# Patient Record
Sex: MALE | Race: WHITE | NOT HISPANIC OR LATINO | Employment: UNEMPLOYED | URBAN - METROPOLITAN AREA
[De-identification: names, ages, dates, MRNs, and addresses within clinical notes are randomized per-mention and may not be internally consistent; named-entity substitution may affect disease eponyms.]

---

## 2021-05-22 ENCOUNTER — HOSPITAL ENCOUNTER (EMERGENCY)
Facility: CLINIC | Age: 56
Discharge: HOME | End: 2021-05-22
Attending: EMERGENCY MEDICINE | Admitting: EMERGENCY MEDICINE
Payer: COMMERCIAL

## 2021-05-22 ENCOUNTER — TRANSFERRED RECORDS (OUTPATIENT)
Dept: HEALTH INFORMATION MANAGEMENT | Facility: CLINIC | Age: 56
End: 2021-05-22

## 2021-05-22 VITALS
OXYGEN SATURATION: 97 % | DIASTOLIC BLOOD PRESSURE: 90 MMHG | SYSTOLIC BLOOD PRESSURE: 144 MMHG | HEIGHT: 72 IN | TEMPERATURE: 98.1 F | BODY MASS INDEX: 28.79 KG/M2 | HEART RATE: 61 BPM | RESPIRATION RATE: 18 BRPM | WEIGHT: 212.52 LBS

## 2021-05-22 DIAGNOSIS — H16.002 ULCER OF LEFT CORNEA: ICD-10-CM

## 2021-05-22 LAB
GRAM STN SPEC: NORMAL
GRAM STN SPEC: NORMAL
KOH PREP SPEC: NORMAL
SPECIMEN SOURCE: NORMAL
SPECIMEN SOURCE: NORMAL

## 2021-05-22 PROCEDURE — 87102 FUNGUS ISOLATION CULTURE: CPT | Performed by: STUDENT IN AN ORGANIZED HEALTH CARE EDUCATION/TRAINING PROGRAM

## 2021-05-22 PROCEDURE — 87070 CULTURE OTHR SPECIMN AEROBIC: CPT | Performed by: STUDENT IN AN ORGANIZED HEALTH CARE EDUCATION/TRAINING PROGRAM

## 2021-05-22 PROCEDURE — 87081 CULTURE SCREEN ONLY: CPT | Performed by: STUDENT IN AN ORGANIZED HEALTH CARE EDUCATION/TRAINING PROGRAM

## 2021-05-22 PROCEDURE — 99283 EMERGENCY DEPT VISIT LOW MDM: CPT | Performed by: EMERGENCY MEDICINE

## 2021-05-22 PROCEDURE — 87205 SMEAR GRAM STAIN: CPT | Performed by: STUDENT IN AN ORGANIZED HEALTH CARE EDUCATION/TRAINING PROGRAM

## 2021-05-22 PROCEDURE — 99283 EMERGENCY DEPT VISIT LOW MDM: CPT

## 2021-05-22 PROCEDURE — 87210 SMEAR WET MOUNT SALINE/INK: CPT | Performed by: STUDENT IN AN ORGANIZED HEALTH CARE EDUCATION/TRAINING PROGRAM

## 2021-05-22 PROCEDURE — 87075 CULTR BACTERIA EXCEPT BLOOD: CPT | Performed by: STUDENT IN AN ORGANIZED HEALTH CARE EDUCATION/TRAINING PROGRAM

## 2021-05-22 PROCEDURE — 87015 SPECIMEN INFECT AGNT CONCNTJ: CPT | Performed by: STUDENT IN AN ORGANIZED HEALTH CARE EDUCATION/TRAINING PROGRAM

## 2021-05-22 PROCEDURE — 87207 SMEAR SPECIAL STAIN: CPT | Mod: TC | Performed by: STUDENT IN AN ORGANIZED HEALTH CARE EDUCATION/TRAINING PROGRAM

## 2021-05-22 RX ORDER — ATORVASTATIN CALCIUM 10 MG/1
10 TABLET, FILM COATED ORAL DAILY
COMMUNITY

## 2021-05-22 RX ORDER — LISINOPRIL 2.5 MG/1
2.5 TABLET ORAL DAILY
COMMUNITY

## 2021-05-22 SDOH — HEALTH STABILITY: MENTAL HEALTH: HOW OFTEN DO YOU HAVE A DRINK CONTAINING ALCOHOL?: NEVER

## 2021-05-22 ASSESSMENT — MIFFLIN-ST. JEOR: SCORE: 1832

## 2021-05-22 ASSESSMENT — ENCOUNTER SYMPTOMS
EYE PAIN: 1
PHOTOPHOBIA: 1

## 2021-05-22 NOTE — ED PROVIDER NOTES
[place] [facility]GENC[facility] DE[facility]MENT ([facility])  5/22/21  History     Chief Complaint   Patient presents with     Eye Problem     The history is provided by the patient and medical records.     Alin Martinez is a 56 year old male with a history notable for HTN and dyslipidemia who presents to the ED from clinic for evaluation of left eye discomfort.  Patient reports developing irritation and dryness in his left eye approximately 12 days ago.  He notes it felt like a stye and presented to an urgent care and was given moxifloxacin drops without relief.  Yesterday, the patient presented to ophthalmology clinic where the left eye was cultured and he was prescribed Natacyn drops. The patient returns to the clinic this morning as instructed by the ophthalmologist felt his symptoms were concerning enough to instruct him to present to [facility] ED.  Here in the ED, the patient notes persistent left eye discomfort.  He also endorses photophobia.  He denies previous surgeries to his left eye.  He notes possible injuring his eye while hitting it on something while in his garage approximately 1 month ago.  He had pain in his eye for 1 minute but then resolved.  Patient denies allergies to medications.  He has no history of immunosuppression.    I have reviewed the Medications, Allergies, Past Medical and Surgical History, and Social History in the AFTER-MOUSE system.  PAST MEDICAL HISTORY: History reviewed. No pertinent past medical history.    PAST SURGICAL HISTORY: History reviewed. No pertinent surgical history.    Past medical history, past surgical history, medications, and allergies were reviewed with the patient. Additional pertinent items: None    FAMILY HISTORY: History reviewed. No pertinent family history.    SOCIAL HISTORY:   Social History     Tobacco Use     Smoking status: Never Smoker     Smokeless tobacco: Never Used   Substance Use Topics     Alcohol use: Never     Frequency: Never     Social history was  reviewed with the patient. Additional pertinent items: None         No Known Allergies     Review of Systems   Eyes: Positive for photophobia and pain.     A complete review of systems was performed with pertinent positives and negatives noted in the HPI, and all other systems negative.    Physical Exam   BP: 138/81  Pulse: 61  Temp: 98.1  F (36.7  C)  Resp: 16  Height: 182.9 cm (6')  Weight: 96.4 kg (212 lb 8.4 oz)  SpO2: 98 %      Physical Exam   Gen:A&Ox3, no acute distress  HEENT:PERRL, no facial swelling or wounds/ecchymoses  Neuro:CN II-XII intact, gait stable  Eyes: EOMI. PERRL, pupil shape is round. Lids and lashes free of lesions.  No foreign bodies noted in the eye on lid eversion. Right eye with perilimbic conjunctival injection. Anterior chamber deep and quiet, free of hyphema or hypopion. Has large ulcer with corneal clouding in the left lower aspect of the cornea.      ED Course   10:12 AM  The patient was seen and examined by Dr. Louise Medley in Room ED 05.      Procedures                 Assessments & Plan (with Medical Decision Making)     57 yo M with corneal ulcer presenting for ophthalmology evaluation.   Visual acuity 20/20 right and 20/30 left.   Ophthalmology consulted and evaluated the patient.   Additional cultures taken from the cornea.   Recommended for patient to continue Natacyn q1 hr and moxifloxacin q2 hrs around the clock.   Preservative free artificial tears as needed.   Follow up with Optho at 8am on Monday.  Pt and I reviewed return precautions and signs of perforation.   Discharged.     I have reviewed the nursing notes.    I have reviewed the findings, diagnosis, plan and need for follow up with the patient.    Discharge Medication List as of 5/22/2021  1:34 PM          Final diagnoses:   Ulcer of left cornea     IBoyd, am serving as a trained medical scribe to document services personally performed by Louise Medley MD, based on the provider's statements to me.       I, Louise Medley MD, was physically present and have reviewed and verified the accuracy of this note documented by Boyd Reid.     5/22/2021   [facility] EMERGENCY DEPARTMENT    MD SALLIE Mendoza Katrina Anne, MD  05/22/21 2038

## 2021-05-22 NOTE — DISCHARGE INSTRUCTIONS
Thank you for coming to the [facility] Emergency Department.     Follow up with Ophthalmology on Monday at 8AM.   [facility]  [facility]  [address], [place] [id number] [phone]    Continue

## 2021-05-22 NOTE — ED TRIAGE NOTES
Pt presents ambulatory to triage from home. Pt states for past 1 week has had mass at lower left cornea  With drainage, pain and vision changes. Pt states has been seen by opto that instructed to follow up here.

## 2021-05-23 LAB
PARASITE SPEC INSPECT: NORMAL
PARASITE SPEC INSPECT: NORMAL
SPECIMEN SOURCE: NORMAL

## 2021-05-23 NOTE — CONSULTS
OPHTHALMOLOGY CONSULT NOTE      Patient: Alin Martinez  Consulted by: ED  Reason for Consult: corneal ulcer    HISTORY OF PRESENTING ILLNESS:     Alin Martinez is a 56 year old healthy male who presents to the ED for evaluation of left eye pain.  Patient reports having hit his left eye with a wooden board while he was moving 2 to 3 weeks ago.  He did not started having any discomfort until 10 days ago when he started to have a foreign body sensation in the left eye.  Has had a little bit of redness and watery discharge, but has not had any significant pain outside of a foreign body sensation since onset.  Not a contact lens wearer nor had any swimming or exposure to dirty water.  10 days ago was seen at an urgent care and was started on an antibiotic drop 4 times a day. 3 days ago had seen an optometrist who had him start Vigamox drops every hour. Yesterday he had seen an ophthalmologist at [facility] and was started on natamycin every hour with Vigamox every 2 hours.  Overall, since starting the antibiotic drops more vigorously 3 days ago, he feels like his discomfort has been improving.  Denies any increased pain.  Patient had been advised by his ophthalmologist today to seek follow-up in the ED for concern for potential acathoamoeba infection and close follow up.     Review of systems were otherwise negative except for that which has been stated above.      OCULAR/MEDICAL/SURGICAL HISTORIES:     Past Ocular History:  Last eye exam: yesterday  Prior eye surgery/laser: none  CTL wearer: no  Glasses: no  GTTs: natamycin q1h and moxifloxacin q2h awhile awake    History reviewed. No pertinent past medical history.    Pertinent FHx of ocular disease: none    EXAMINATION:     Base Eye Exam     Visual Acuity       Right Left    Near sc 20/25 20/30-3          Tonometry    deferred           Pupils       Pupils    Right PERRL    Left PERRL          Extraocular Movement       Right Left     Full, Ortho Full, Ortho           Neuro/Psych     Oriented x3: Yes    Mood/Affect: Normal          Dilation    deferred             Slit Lamp and Fundus Exam     External Exam       Right Left    External Normal Normal          Slit Lamp Exam       Right Left    Lids/Lashes Normal Normal    Conjunctiva/Sclera White and quiet 1+ diffuse inj    Cornea Clear 1.5x1.9mm staining epi defect cratered ulcer with infiltrate, ~25% centrally thinned, whispy scattered subepi opacities paraulcer    Anterior Chamber Deep and quiet Deep and quiet    Iris Round and reactive Round and reactive    Lens Clear Clear    Vitreous Normal Normal                ASSESSMENT/PLAN:     Alin Martinez is a 56 year old male who presents with:    # Left inferocentral corneal ulcer  S/p left eye injury from a wooden board 3-4 weeks ago  Onset of FBS 5/12/21 (10 days ago)  Whispy paraulcer subepi opacities near the epi defect  Most suspicious of fungal infection    Plan:  - reviewed photo and discussed case with cornea fellow, Dr. Collier  - broad cultures with corneal scraping obtained today: aerobic, gram stain, KOH, anaerobic, fungal, acanthomoeba culture and stain  - continue natamycin EVERY HOUR AROUND THE CLOCK  - continue moxifloxacin EVERY 2 HOURS AROUND THE CLOCK  - PF artificial tears as needed for discomfort  - space out drops by approx 5 minutes  - follow up at 8 am in cornea clinic with Dr. Garcia on Monday  - call on-call provider number for any concerns or worsening sx    Discussed importance of close follow-up. Discussed risk of vision loss and losing the eye.     It is our pleasure to participate in this patient's care and treatment. Please contact us with any further questions or concerns.      Alka Bauman MD  PGY-2 Resident Physician   Department of Ophthalmology  Pager: [phone]    Attending Physician Attestation:  Complete documentation of historical and exam elements from today's encounter can be found in the full encounter summary report (not reduplicated  in this progress note).  I personally obtained the chief complaint(s) and history of present illness.  I confirmed and edited as necessary the review of systems, past medical/surgical history, family history, social history, and examination findings as documented by others; and I examined the patient myself.  I personally reviewed the relevant ophthalmic tests, images, and reports as documented above (if applicable). I agree with the interpretation(s) as documented by the resident and have edited the corresponding report(s) as necessary. I formulated and edited as necessary the assessment and plan and discussed the findings and management plan with the patient and family.     I have the following revisions: Multifocal infiltrate with fluffy borders most consistent with fungal infection. Patient had been on antibiotics for ~3 days and natamycin for ~24 hours prior to culture. Close follow-up needed.     Staci Collier MD

## 2021-05-24 ENCOUNTER — OFFICE VISIT (OUTPATIENT)
Dept: OPHTHALMOLOGY | Facility: CLINIC | Age: 56
End: 2021-05-24
Attending: OPHTHALMOLOGY
Payer: COMMERCIAL

## 2021-05-24 DIAGNOSIS — B49 FUNGAL KERATITIS OF LEFT EYE: ICD-10-CM

## 2021-05-24 DIAGNOSIS — H16.012 CENTRAL CORNEAL ULCER OF LEFT EYE: Primary | ICD-10-CM

## 2021-05-24 DIAGNOSIS — H04.123 DRY EYE SYNDROME OF BOTH EYES: ICD-10-CM

## 2021-05-24 DIAGNOSIS — H16.8 FUNGAL KERATITIS OF LEFT EYE: ICD-10-CM

## 2021-05-24 PROCEDURE — 99203 OFFICE O/P NEW LOW 30 MIN: CPT | Performed by: OPHTHALMOLOGY

## 2021-05-24 PROCEDURE — G0463 HOSPITAL OUTPT CLINIC VISIT: HCPCS

## 2021-05-24 RX ORDER — MOXIFLOXACIN 5 MG/ML
1 SOLUTION/ DROPS OPHTHALMIC
Qty: 6 ML | Refills: 6 | Status: SHIPPED | OUTPATIENT
Start: 2021-05-24

## 2021-05-24 RX ORDER — TOBRAMYCIN 3 MG/ML
1-2 SOLUTION/ DROPS OPHTHALMIC EVERY 4 HOURS
COMMUNITY

## 2021-05-24 RX ORDER — MOXIFLOXACIN 5 MG/ML
1 SOLUTION/ DROPS OPHTHALMIC
COMMUNITY
End: 2021-05-24

## 2021-05-24 ASSESSMENT — VISUAL ACUITY
OS_PH_SC+: -2
OS_SC+: +2
OD_SC: 20/20
METHOD: SNELLEN - LINEAR
OD_SC+: -1
OS_SC: 20/50
OS_PH_SC: 20/40

## 2021-05-24 ASSESSMENT — CONF VISUAL FIELD
OS_NORMAL: 1
METHOD: COUNTING FINGERS
OD_NORMAL: 1

## 2021-05-24 ASSESSMENT — TONOMETRY
OS_IOP_MMHG: 15
OD_IOP_MMHG: 18
IOP_METHOD: ICARE

## 2021-05-24 ASSESSMENT — SLIT LAMP EXAM - LIDS
COMMENTS: NORMAL
COMMENTS: NORMAL

## 2021-05-24 ASSESSMENT — EXTERNAL EXAM - LEFT EYE: OS_EXAM: NORMAL

## 2021-05-24 ASSESSMENT — EXTERNAL EXAM - RIGHT EYE: OD_EXAM: NORMAL

## 2021-05-24 NOTE — LETTER
5/24/2021       RE: Alin Martinez  [address] [place] [address]     Dear Colleague,    Thank you for referring your patient, Alin Martinez, to the [facility] at [facility]. Please see a copy of my visit note below.    CC:  Referral form ED visit for LE K Ulcer.    HPI:   Alin Martinez is a 56 year old healthy male who presents to the ED for evaluation of left eye pain.  Patient reports having hit his left eye with a wooden board while he was moving 2 to 3 weeks ago.  He did not started having any discomfort until 10 days ago when he started to have a foreign body sensation in the left eye.  Has had a little bit of redness and watery discharge, but has not had any significant pain outside of a foreign body sensation since onset.  Not a contact lens wearer nor had any swimming or exposure to dirty water.  10 days ago was seen at an urgent care and was started on an antibiotic drop 4 times a day. 3 days ago had seen an optometrist who had him start Vigamox drops every hour. Yesterday he had seen an ophthalmologist at [facility] and was started on natamycin every hour with Vigamox every 2 hours.  Overall, since starting the antibiotic drops more vigorously 3 days ago, he feels like his discomfort has been improving.  Denies any increased pain.  Patient had been advised by his ophthalmologist today to seek follow-up in the ED for concern for potential acathoamoeba infection and close follow up.    Interval Hx: Pain improved. Using gtts as directed. Vision fluctuates but remains relatively well preserved.     POHx:  Last eye exam: 5/22/21  Prior eye surgery/laser: NONE  CTL wearer: NO  Glasses: NO    Family Hx of eye disease: NONE    Gtts Currenly Taking:  natamycin q1h LE  moxifloxacin q2h LE awhile awake    Allergies:  NKDA    A/P:  # Left inferocentral corneal ulcer - started 5/12/21. Likely fungal.  S/p left eye injury from a wooden board 3-4  weeks ago  Multifocal infiltrate with fuzzy borders- most c/w fungal infection  - cultures 5/22: NGTD; gram stain without organisms, KOH without fungus  - Acanth negative  - looks improved with consolidation of infiltrate  - pt is self pay     PLAN:  - REDUCE natamycin q1h while awake, STOP overnight  - Increase moxifloxacin to q1 hour while awake, STOP overnight  - PF artificial tears as needed for discomfort  - space out drops by approx 5 minutes  - warning signs/sx's reviewed    Follow up Cornea: 5/26        Again, thank you for allowing me to participate in the care of your patient.      Sincerely,    Oh Garcia MD

## 2021-05-24 NOTE — NURSING NOTE
Chief Complaints and History of Present Illnesses   Patient presents with     Corneal Ulcer Evaluation     Chief Complaint(s) and History of Present Illness(es)     Corneal Ulcer Evaluation     Laterality: left eye    Pain scale: 4/10              Comments     Alin is here as a new patient for evaluation of LE corneal ulcer. He is uncertain what caused the discomfort LE. This started last week, and he went to a [facility] clinic, where he got an antibiotic. That did not help so he saw an optometrist who gave him ofloxacin. He later received Natacyn and sent here.     Monroe Anderson COT 8:53 AM May 24, 2021

## 2021-05-24 NOTE — PATIENT INSTRUCTIONS
NATAMYCIN (BROWN BOTTLE) EVERY 1 HOUR WHILE AWAKE, STOP OVERNIGHT    MOXIFLOXACIN (TAN TOP) EVERY 1 HOUR WHILE AWAKE, STOP OVERNIGHT    PRESERVATIVE FREE ARTIFICIAL TEARS FOR ANY IRRITATION

## 2021-05-24 NOTE — PROGRESS NOTES
CC:  Referral form ED visit for LE K Ulcer.    HPI:   Alin Martinez is a 56 year old healthy male who presents to the ED for evaluation of left eye pain.  Patient reports having hit his left eye with a wooden board while he was moving 2 to 3 weeks ago.  He did not started having any discomfort until 10 days ago when he started to have a foreign body sensation in the left eye.  Has had a little bit of redness and watery discharge, but has not had any significant pain outside of a foreign body sensation since onset.  Not a contact lens wearer nor had any swimming or exposure to dirty water.  10 days ago was seen at an urgent care and was started on an antibiotic drop 4 times a day. 3 days ago had seen an optometrist who had him start Vigamox drops every hour. Yesterday he had seen an ophthalmologist at [facility] and was started on natamycin every hour with Vigamox every 2 hours.  Overall, since starting the antibiotic drops more vigorously 3 days ago, he feels like his discomfort has been improving.  Denies any increased pain.  Patient had been advised by his ophthalmologist today to seek follow-up in the ED for concern for potential acathoamoeba infection and close follow up.    Interval Hx: Pain improved. Using gtts as directed. Vision fluctuates but remains relatively well preserved.     POHx:  Last eye exam: 5/22/21  Prior eye surgery/laser: NONE  CTL wearer: NO  Glasses: NO    Family Hx of eye disease: NONE    Gtts Currenly Taking:  natamycin q1h LE  moxifloxacin q2h LE awhile awake    Allergies:  NKDA    A/P:  # Left inferocentral corneal ulcer - started 5/12/21. Likely fungal.  S/p left eye injury from a wooden board 3-4 weeks ago  Multifocal infiltrate with fuzzy borders- most c/w fungal infection  - cultures 5/22: NGTD; gram stain without organisms, KOH without fungus  - Acanth negative  - looks improved with consolidation of infiltrate  - pt is self pay     PLAN:  - REDUCE natamycin q1h while awake, STOP  overnight  - Increase moxifloxacin to q1 hour while awake, STOP overnight  - PF artificial tears as needed for discomfort  - space out drops by approx 5 minutes  - warning signs/sx's reviewed    Follow up Cornea: 5/26    Staci Collier MD  Cornea & External Disease Fellow    Attending Physician Attestation:  Complete documentation of historical and exam elements from today's encounter can be found in the full encounter summary report (not reduplicated in this progress note).  I personally obtained the chief complaint(s) and history of present illness.  I confirmed and edited as necessary the review of systems, past medical/surgical history, family history, social history, and examination findings as documented by others; and I examined the patient myself.  I personally reviewed the relevant tests, images, and reports as documented above.  I formulated and edited as necessary the assessment and plan and discussed the findings and management plan with the patient and family. - Oh Garcia MD

## 2021-05-26 ENCOUNTER — OFFICE VISIT (OUTPATIENT)
Dept: OPHTHALMOLOGY | Facility: CLINIC | Age: 56
End: 2021-05-26
Attending: OPHTHALMOLOGY
Payer: COMMERCIAL

## 2021-05-26 DIAGNOSIS — H16.8 FUNGAL KERATITIS OF LEFT EYE: ICD-10-CM

## 2021-05-26 DIAGNOSIS — H04.123 DRY EYE SYNDROME OF BOTH EYES: ICD-10-CM

## 2021-05-26 DIAGNOSIS — B49 FUNGAL KERATITIS OF LEFT EYE: ICD-10-CM

## 2021-05-26 DIAGNOSIS — H16.012 CENTRAL CORNEAL ULCER OF LEFT EYE: Primary | ICD-10-CM

## 2021-05-26 DIAGNOSIS — H18.20 CORNEAL EDEMA OF LEFT EYE: ICD-10-CM

## 2021-05-26 PROCEDURE — G0463 HOSPITAL OUTPT CLINIC VISIT: HCPCS

## 2021-05-26 PROCEDURE — 99213 OFFICE O/P EST LOW 20 MIN: CPT | Performed by: OPHTHALMOLOGY

## 2021-05-26 ASSESSMENT — TONOMETRY
OD_IOP_MMHG: 17
OS_IOP_MMHG: 15
IOP_METHOD: ICARE

## 2021-05-26 ASSESSMENT — CONF VISUAL FIELD
OD_NORMAL: 1
METHOD: COUNTING FINGERS
OS_NORMAL: 1

## 2021-05-26 ASSESSMENT — VISUAL ACUITY
OS_SC: 20/40 SLOW
OD_SC+: -2
METHOD: SNELLEN - LINEAR
OS_SC+: -2
OD_SC: 20/20 SLOW

## 2021-05-26 ASSESSMENT — SLIT LAMP EXAM - LIDS
COMMENTS: NORMAL
COMMENTS: NORMAL

## 2021-05-26 ASSESSMENT — EXTERNAL EXAM - RIGHT EYE: OD_EXAM: NORMAL

## 2021-05-26 ASSESSMENT — EXTERNAL EXAM - LEFT EYE: OS_EXAM: NORMAL

## 2021-05-26 NOTE — PATIENT INSTRUCTIONS
EYE DROP TAPER FOR BOTH NATAMYCIN (BROWN BOTTLE) & MOXIFLOXACIN (TAN TOP)     - EVERY 2 HOURS WHILE AWAKE x 2 days (through Fri 5/28)   - EVERY 3 HOURS WHILE AWAKE x 2 days (through Sun 5/30)   - Then EVERY 4 HOURS WHILE AWAKE AND CONTINUE    PRESERVATIVE FREE ARTIFICIAL TEARS FOR ANY IRRITATION

## 2021-05-26 NOTE — PROGRESS NOTES
CC:  Referral form ED visit for LE K Ulcer.    HPI:   Alin Martinez is a 56 year old healthy male who presents to the ED for evaluation of left eye pain.  Patient reports having hit his left eye with a wooden board while he was moving 2 to 3 weeks ago.  He did not started having any discomfort until 10 days ago when he started to have a foreign body sensation in the left eye.  Has had a little bit of redness and watery discharge, but has not had any significant pain outside of a foreign body sensation since onset.  Not a contact lens wearer nor had any swimming or exposure to dirty water.  10 days ago was seen at an urgent care and was started on an antibiotic drop 4 times a day. 3 days ago had seen an optometrist who had him start Vigamox drops every hour. Yesterday he had seen an ophthalmologist at [facility] and was started on natamycin every hour with Vigamox every 2 hours.  Overall, since starting the antibiotic drops more vigorously 3 days ago, he feels like his discomfort has been improving.  Denies any increased pain.  Patient had been advised by his ophthalmologist today to seek follow-up in the ED for concern for potential acathoamoeba infection and close follow up.    Interval Hx: Pain improved. Using gtts as directed. Vision fluctuates but remains relatively well preserved.     POHx:  Last eye exam: 5/22/21  Prior eye surgery/laser: NONE  CTL wearer: NO  Glasses: NO    Family Hx of eye disease: NONE    Gtts Currenly Taking:  natamycin q1h LE  moxifloxacin q2h LE awhile awake    Allergies:  NKDA    A/P:  # Left inferocentral corneal ulcer - started 5/12/21. Fungal vs bacterial.  S/p left eye injury from a wooden board 3-4 weeks ago  Multifocal infiltrate with fuzzy borders  - cultures 5/22: NGTD; gram stain without organisms, KOH without fungus  - Acanth negative  - continues to look improved with consolidation of infiltrate, near resolution of epi defect (clump of natamycin debrided from central cornea  with resulting 1x1 mm defect)  - pt is self pay     PLAN:  - REDUCE natamycin and moxifloxacin taper:   - every 2 hours for 2 days   - every 3 hours for 2 days   - every 4 hours and continue  - PF artificial tears as needed for discomfort  - space out drops by approx 5 minutes  - warning signs/sx's reviewed    Follow up Cornea: 1 week as scheduled    Staci Collier MD  Cornea & External Disease Fellow    Attending Physician Attestation:  Complete documentation of historical and exam elements from today's encounter can be found in the full encounter summary report (not reduplicated in this progress note).  I personally obtained the chief complaint(s) and history of present illness.  I confirmed and edited as necessary the review of systems, past medical/surgical history, family history, social history, and examination findings as documented by others; and I examined the patient myself.  I personally reviewed the relevant tests, images, and reports as documented above.  I formulated and edited as necessary the assessment and plan and discussed the findings and management plan with the patient and family. - Oh Garcia MD

## 2021-05-29 LAB
BACTERIA SPEC CULT: NO GROWTH
BACTERIA SPEC CULT: NORMAL
Lab: NORMAL
SPECIMEN SOURCE: NORMAL
SPECIMEN SOURCE: NORMAL

## 2021-05-30 ENCOUNTER — HEALTH MAINTENANCE LETTER (OUTPATIENT)
Age: 56
End: 2021-05-30

## 2021-06-01 LAB
BACTERIA SPEC CULT: NORMAL
SPECIMEN SOURCE: NORMAL

## 2021-06-02 ENCOUNTER — OFFICE VISIT (OUTPATIENT)
Dept: OPHTHALMOLOGY | Facility: CLINIC | Age: 56
End: 2021-06-02
Attending: OPHTHALMOLOGY
Payer: COMMERCIAL

## 2021-06-02 DIAGNOSIS — H17.9 CORNEA SCAR: ICD-10-CM

## 2021-06-02 DIAGNOSIS — H16.012 CENTRAL CORNEAL ULCER OF LEFT EYE: Primary | ICD-10-CM

## 2021-06-02 DIAGNOSIS — B49 FUNGAL KERATITIS OF LEFT EYE: ICD-10-CM

## 2021-06-02 DIAGNOSIS — H04.123 DRY EYE SYNDROME OF BOTH EYES: ICD-10-CM

## 2021-06-02 DIAGNOSIS — H16.8 FUNGAL KERATITIS OF LEFT EYE: ICD-10-CM

## 2021-06-02 PROCEDURE — G0463 HOSPITAL OUTPT CLINIC VISIT: HCPCS

## 2021-06-02 PROCEDURE — 92285 EXTERNAL OCULAR PHOTOGRAPHY: CPT | Performed by: OPHTHALMOLOGY

## 2021-06-02 PROCEDURE — 99213 OFFICE O/P EST LOW 20 MIN: CPT | Performed by: OPHTHALMOLOGY

## 2021-06-02 ASSESSMENT — CONF VISUAL FIELD
METHOD: COUNTING FINGERS
OD_NORMAL: 1
OS_NORMAL: 1

## 2021-06-02 ASSESSMENT — TONOMETRY
OS_IOP_MMHG: 12
IOP_METHOD: ICARE
OD_IOP_MMHG: 17

## 2021-06-02 ASSESSMENT — EXTERNAL EXAM - LEFT EYE: OS_EXAM: NORMAL

## 2021-06-02 ASSESSMENT — SLIT LAMP EXAM - LIDS
COMMENTS: NORMAL
COMMENTS: NORMAL

## 2021-06-02 ASSESSMENT — VISUAL ACUITY
OD_SC+: -3
OS_SC: 20/20
METHOD: SNELLEN - LINEAR
OD_SC: 20/25 SLOW
OS_SC+: -1

## 2021-06-02 ASSESSMENT — EXTERNAL EXAM - RIGHT EYE: OD_EXAM: NORMAL

## 2021-06-02 NOTE — PROGRESS NOTES
CC:  Referral form ED visit for LE K Ulcer.    HPI:   Alin Martinez is a 56 year old healthy male who presents to the ED for evaluation of left eye pain.  Patient reports having hit his left eye with a wooden board while he was moving 2 to 3 weeks ago.  He did not started having any discomfort until 10 days ago when he started to have a foreign body sensation in the left eye.  Has had a little bit of redness and watery discharge, but has not had any significant pain outside of a foreign body sensation since onset.  Not a contact lens wearer nor had any swimming or exposure to dirty water.  10 days ago was seen at an urgent care and was started on an antibiotic drop 4 times a day. 3 days ago had seen an optometrist who had him start Vigamox drops every hour. Yesterday he had seen an ophthalmologist at [facility] and was started on natamycin every hour with Vigamox every 2 hours.  Overall, since starting the antibiotic drops more vigorously 3 days ago, he feels like his discomfort has been improving.  Denies any increased pain.  Patient had been advised by his ophthalmologist today to seek follow-up in the ED for concern for potential acathoamoeba infection and close follow up.    Interval Hx: Pain continues to improve. Using gtts as directed. Vision fluctuates but remains relatively well preserved.     POHx:  Last eye exam: 5/22/21  Prior eye surgery/laser: NONE  CTL wearer: NO  Glasses: NO    Family Hx of eye disease: NONE    Gtts Currenly Taking:  natamycin q4h LE  moxifloxacin q4h LE     Allergies:  NKDA    A/P:  # Left inferocentral corneal ulcer - started 5/12/21. Fungal vs bacterial.  S/p left eye injury from a wooden board 3-4 weeks ago  Multifocal infiltrate with fuzzy borders  - cultures 5/22: NGTD; gram stain without organisms, KOH without fungus  - Acanth negative  - continues to look improved with consolidation of infiltrate, near resolution of epi defect (clump of natamycin debrided from central cornea  with resulting 1x1 mm defect)  - pt is self pay     PLAN:  - STOP natamycin and moxifloxacin  - Defer any topical steroids given good vision and watch infiltrate  - Increase PFAT's to every 1-2 hours prn  - space out drops by approx 5 minutes  - warning signs/sx's reviewed    Follow up Cornea: 1 week as scheduled- start prednisolone if doing well.     Staci Collier MD  Cornea & External Disease Fellow    Attending Physician Attestation:  Complete documentation of historical and exam elements from today's encounter can be found in the full encounter summary report (not reduplicated in this progress note).  I personally obtained the chief complaint(s) and history of present illness.  I confirmed and edited as necessary the review of systems, past medical/surgical history, family history, social history, and examination findings as documented by others; and I examined the patient myself.  I personally reviewed the relevant tests, images, and reports as documented above.  I formulated and edited as necessary the assessment and plan and discussed the findings and management plan with the patient and family. I personally reviewed the ophthalmic test(s) associated with this encounter, agree with the interpretation(s) as documented by the resident/fellow, and have edited the corresponding report(s) as necessary. - Oh Garcia MD

## 2021-06-02 NOTE — PATIENT INSTRUCTIONS
STOP NATAMYCIN (BROWN BOTTLE) & MOXIFLOXACIN (TAN TOP)    PRESERVATIVE FREE ARTIFICIAL TEARS every 30-60 minutes as needed for irritation. Use a minimum of 4x/day.

## 2021-06-02 NOTE — NURSING NOTE
Chief Complaint(s) and History of Present Illness(es)     Corneal Ulcer Follow Up     In left eye.  Associated symptoms include dryness and photophobia (LE has been really sensitve to light recently ).  Negative for eye pain, redness and tearing.  Comments: (a need for brighter lights: LE is a little dry at night. ).  Pain was noted as 0/10.              Comments     1 week f/u for Left inferocentral corneal ulcer, LE. Pt notes the LE is getting much better and seems to be getting better everyday. Pt denies any other changes or concerns.     Ocular meds:  Natamycin Q4H LE  Moxifloxacin Q4H LE  PFATs PRN BE    REG Christine 1:35 PM June 2, 2021

## 2021-06-09 ENCOUNTER — OFFICE VISIT (OUTPATIENT)
Dept: OPHTHALMOLOGY | Facility: CLINIC | Age: 56
End: 2021-06-09
Attending: OPHTHALMOLOGY
Payer: COMMERCIAL

## 2021-06-09 DIAGNOSIS — H16.012 CENTRAL CORNEAL ULCER OF LEFT EYE: Primary | ICD-10-CM

## 2021-06-09 DIAGNOSIS — H17.9 CORNEA SCAR: ICD-10-CM

## 2021-06-09 DIAGNOSIS — H18.20 CORNEAL EDEMA OF LEFT EYE: ICD-10-CM

## 2021-06-09 DIAGNOSIS — H16.8 FUNGAL KERATITIS OF LEFT EYE: ICD-10-CM

## 2021-06-09 DIAGNOSIS — B49 FUNGAL KERATITIS OF LEFT EYE: ICD-10-CM

## 2021-06-09 PROCEDURE — G0463 HOSPITAL OUTPT CLINIC VISIT: HCPCS

## 2021-06-09 PROCEDURE — 99213 OFFICE O/P EST LOW 20 MIN: CPT | Mod: GC | Performed by: OPHTHALMOLOGY

## 2021-06-09 RX ORDER — PREDNISOLONE ACETATE 10 MG/ML
SUSPENSION/ DROPS OPHTHALMIC
Qty: 10 ML | Refills: 1 | Status: SHIPPED | OUTPATIENT
Start: 2021-06-09

## 2021-06-09 ASSESSMENT — CONF VISUAL FIELD
OD_NORMAL: 1
OS_NORMAL: 1
METHOD: COUNTING FINGERS

## 2021-06-09 ASSESSMENT — VISUAL ACUITY
OD_SC: 20/25 SLOW
METHOD: SNELLEN - LINEAR
OD_SC+: -2
OS_SC+: -1
OS_SC: 20/20

## 2021-06-09 ASSESSMENT — TONOMETRY
OD_IOP_MMHG: 18
OS_IOP_MMHG: 12
IOP_METHOD: ICARE

## 2021-06-09 ASSESSMENT — SLIT LAMP EXAM - LIDS
COMMENTS: NORMAL
COMMENTS: NORMAL

## 2021-06-09 ASSESSMENT — EXTERNAL EXAM - RIGHT EYE: OD_EXAM: NORMAL

## 2021-06-09 ASSESSMENT — EXTERNAL EXAM - LEFT EYE: OS_EXAM: NORMAL

## 2021-06-09 NOTE — PROGRESS NOTES
CC:  Referral form ED visit for LE K Ulcer.    HPI:   Alin Martinez is a 56 year old healthy male who presents to the ED for evaluation of left eye pain.  Patient reports having hit his left eye with a wooden board while he was moving 2 to 3 weeks ago.  He did not started having any discomfort until 10 days ago when he started to have a foreign body sensation in the left eye.  Has had a little bit of redness and watery discharge, but has not had any significant pain outside of a foreign body sensation since onset.  Not a contact lens wearer nor had any swimming or exposure to dirty water.  10 days ago was seen at an urgent care and was started on an antibiotic drop 4 times a day. 3 days ago had seen an optometrist who had him start Vigamox drops every hour. Yesterday he had seen an ophthalmologist at [facility] and was started on natamycin every hour with Vigamox every 2 hours.  Overall, since starting the antibiotic drops more vigorously 3 days ago, he feels like his discomfort has been improving.  Denies any increased pain.  Patient had been advised by his ophthalmologist today to seek follow-up in the ED for concern for potential acathoamoeba infection and close follow up.    Interval Hx: Stopped natamycin and moxi last visit. Doing very well off these meds. Vision stable/ remains great. No pain, redness, tearing. He has mild glare. Photophobia improved.    POHx:  Last eye exam: 5/22/21  Prior eye surgery/laser: NONE  CTL wearer: NO  Glasses: NO    Family Hx of eye disease: NONE    Gtts Currenly Taking:  NONE    Completed therapy:  - natamycin to left eye (5/22-6/1)  - moxifloxacin to left eye (5/22-6/1)    Allergies:  NKDA    A/P:  # Left inferocentral corneal ulcer - symptoms started 5/12/21. Fungal vs bacterial.  S/p left eye injury from a wooden board 3-4 weeks ago  Multifocal infiltrate with fuzzy borders  - cultures 5/22: NGTD; gram stain without organisms, KOH without fungus  - Acanth  negative     PLAN:  - Remain OFF natamycin and moxifloxacin  - BEGIN prednisolone BID x 2 weeks, daily x 2 weeks, then STOP  - Increase PFAT's to every 1-2 hours prn  - space out drops by approx 5 minutes  - warning signs/sx's reviewed    Follow up: 4-6 weeks --- sooner PRN    Staci Collier MD  Cornea & External Disease Fellow    Attending Physician Attestation:  Complete documentation of historical and exam elements from today's encounter can be found in the full encounter summary report (not reduplicated in this progress note).  I personally obtained the chief complaint(s) and history of present illness.  I confirmed and edited as necessary the review of systems, past medical/surgical history, family history, social history, and examination findings as documented by others; and I examined the patient myself.  I personally reviewed the relevant tests, images, and reports as documented above.  I formulated and edited as necessary the assessment and plan and discussed the findings and management plan with the patient and family. - hO Garcia MD

## 2021-06-09 NOTE — NURSING NOTE
Chief Complaint(s) and History of Present Illness(es)     Corneal Ulcer Follow Up     In left eye.  Associated symptoms include Negative for dryness, eye pain, redness and tearing.  Pain was noted as 0/10.              Comments     1 week f/u for Central Corneal ulcer LE. Pt notes LE is getting better every day. Pt denies any other changes or concerns.     Ocular meds:  PFATs Q1-2 hours BE    Rebeca Valencia, REG 1:39 PM June 9, 2021

## 2021-06-21 LAB
FUNGUS SPEC CULT: NORMAL
SPECIMEN SOURCE: NORMAL

## 2021-07-08 ENCOUNTER — TELEPHONE (OUTPATIENT)
Dept: OPHTHALMOLOGY | Facility: CLINIC | Age: 56
End: 2021-07-08

## 2021-07-08 NOTE — TELEPHONE ENCOUNTER
Called and M for Alin to reschedule his 7/21 appointment with Dr. Garcia. Dr. Garcia would like Alin to see Dr. Weber in 3 to 4 weeks.     Thanks,     Risa

## 2021-09-19 ENCOUNTER — HEALTH MAINTENANCE LETTER (OUTPATIENT)
Age: 56
End: 2021-09-19

## 2021-11-24 ENCOUNTER — OFFICE VISIT (OUTPATIENT)
Dept: OPHTHALMOLOGY | Facility: CLINIC | Age: 56
End: 2021-11-24
Attending: OPHTHALMOLOGY
Payer: COMMERCIAL

## 2021-11-24 DIAGNOSIS — H17.9 CORNEA SCAR: Primary | ICD-10-CM

## 2021-11-24 DIAGNOSIS — H02.056 TRICHIASIS OF LEFT EYE WITHOUT ENTROPION: ICD-10-CM

## 2021-11-24 DIAGNOSIS — L30.9 PERIORBITAL DERMATITIS: ICD-10-CM

## 2021-11-24 DIAGNOSIS — H01.00B BLEPHARITIS OF UPPER AND LOWER EYELIDS OF BOTH EYES, UNSPECIFIED TYPE: ICD-10-CM

## 2021-11-24 DIAGNOSIS — H01.00A BLEPHARITIS OF UPPER AND LOWER EYELIDS OF BOTH EYES, UNSPECIFIED TYPE: ICD-10-CM

## 2021-11-24 PROCEDURE — 67820 REVISE EYELASHES: CPT | Performed by: OPHTHALMOLOGY

## 2021-11-24 PROCEDURE — 99215 OFFICE O/P EST HI 40 MIN: CPT | Mod: 25 | Performed by: OPHTHALMOLOGY

## 2021-11-24 PROCEDURE — G0463 HOSPITAL OUTPT CLINIC VISIT: HCPCS

## 2021-11-24 RX ORDER — TACROLIMUS 0.3 MG/G
OINTMENT TOPICAL AT BEDTIME
Qty: 30 G | Refills: 1 | Status: SHIPPED | OUTPATIENT
Start: 2021-11-24

## 2021-11-24 ASSESSMENT — VISUAL ACUITY
OD_SC+: -2
OD_SC: 20/20
OS_SC: 20/20
METHOD: SNELLEN - LINEAR
OS_SC+: -1

## 2021-11-24 ASSESSMENT — CONF VISUAL FIELD
OS_NORMAL: 1
OD_NORMAL: 1
METHOD: COUNTING FINGERS

## 2021-11-24 ASSESSMENT — SLIT LAMP EXAM - LIDS: COMMENTS: NORMAL

## 2021-11-24 ASSESSMENT — TONOMETRY
OD_IOP_MMHG: 17
IOP_METHOD: ICARE
OS_IOP_MMHG: 15

## 2021-11-24 NOTE — PROGRESS NOTES
CC:  Referral form ED visit for LE K Ulcer.    HPI:   Alin Martinez is a 56 year old healthy male who presents to the ED for evaluation of left eye pain.  Patient reports having hit his left eye with a wooden board while he was moving 2 to 3 weeks ago.  He did not started having any discomfort until 10 days ago when he started to have a foreign body sensation in the left eye.  Has had a little bit of redness and watery discharge, but has not had any significant pain outside of a foreign body sensation since onset.  Not a contact lens wearer nor had any swimming or exposure to dirty water.  10 days ago was seen at an urgent care and was started on an antibiotic drop 4 times a day. 3 days ago had seen an optometrist who had him start Vigamox drops every hour. Yesterday he had seen an ophthalmologist at [facility] and was started on natamycin every hour with Vigamox every 2 hours.  Overall, since starting the antibiotic drops more vigorously 3 days ago, he feels like his discomfort has been improving.  Denies any increased pain.  Patient had been advised by his ophthalmologist today to seek follow-up in the ED for concern for potential acathoamoeba infection and close follow up.    Interval Hx: Pt here for 5 month follow up.  Overall he's doing well.  Vision is good.  He notes that both eyes itch quite a bit.  Now and again he gets sharp pain left eye.      POHx:  Last eye exam: 5/22/21  Prior eye surgery/laser: NONE  CTL wearer: NO  Glasses: NO    Family Hx of eye disease: NONE    Gtts Currenly Taking:  PFATs QID OU    Completed therapy:  - natamycin to left eye (5/22-6/1)  - moxifloxacin to left eye (5/22-6/1)    Allergies:  NKDA    A/P:  # Left inferocentral corneal ulcer - symptoms started 5/12/21. Fungal vs bacterial.  # Dry eyes/blepharitis/periorbital dermatitis  S/p left eye injury from a wooden board 3-4 weeks ago  Multifocal infiltrate with fuzzy borders  - cultures 5/22: NGTD; gram stain without organisms,  KOH without fungus  - Acanth negative     PLAN:  - Consider plugs or topical steroids - if no improvement  - Use PFATs QID OU  - start WC BID OU  - lid hygiene BID OU  - continue omega-3 fish oil  - start protopic ointment to lids QHS OU  - humidfier by the computer    RTC 5-6 months    Bridger Smalls DO  Fellow, Cornea & External Disease  Department of Ophthalmology  [facility]    Attending Physician Attestation:  Complete documentation of historical and exam elements from today's encounter can be found in the full encounter summary report (not reduplicated in this progress note).  I personally obtained the chief complaint(s) and history of present illness.  I confirmed and edited as necessary the review of systems, past medical/surgical history, family history, social history, and examination findings as documented by others; and I examined the patient myself.  I personally reviewed the relevant tests, images, and reports as documented above.  I formulated and edited as necessary the assessment and plan and discussed the findings and management plan with the patient and family. I was present for the key portions of the procedure and immediately available for the remainder. - Oh Garcia MD    I personally spent great than 40min with the patient, of which >50% of the time was spent face to face with the patient, counseling and coordinating care with the patient. This excludes time spent performing the procedure. We discussed the complexity of his diagnosis, the need for further information prior to proceeding with yet another surgery, and the unknown prognosis for the patient at this time.    Oh Garcia MD

## 2021-11-24 NOTE — NURSING NOTE
Chief Complaints and History of Present Illnesses   Patient presents with     Follow Up     Central corneal ulcer of left eye     Chief Complaint(s) and History of Present Illness(es)     Follow Up     Laterality: left eye    Course: stable    Associated symptoms: photophobia.  Negative for discharge, eye pain, redness and tearing    Comments: Central corneal ulcer of left eye              Comments     C/o light sens left eye, eye feels tired  C/o of itching red eyelids X 6 months  Using:  PFAT's 5-6 X/day both eye and   systaine or refresh at night     Klaudia Post COT 2:21 PM November 24, 2021                         .

## 2022-06-25 ENCOUNTER — HEALTH MAINTENANCE LETTER (OUTPATIENT)
Age: 57
End: 2022-06-25

## 2022-11-20 ENCOUNTER — HEALTH MAINTENANCE LETTER (OUTPATIENT)
Age: 57
End: 2022-11-20

## 2023-07-08 ENCOUNTER — HEALTH MAINTENANCE LETTER (OUTPATIENT)
Age: 58
End: 2023-07-08

## 2024-08-31 ENCOUNTER — HEALTH MAINTENANCE LETTER (OUTPATIENT)
Age: 59
End: 2024-08-31